# Patient Record
Sex: FEMALE | Race: WHITE | Employment: FULL TIME | URBAN - METROPOLITAN AREA
[De-identification: names, ages, dates, MRNs, and addresses within clinical notes are randomized per-mention and may not be internally consistent; named-entity substitution may affect disease eponyms.]

---

## 2018-03-15 ENCOUNTER — OFFICE VISIT (OUTPATIENT)
Dept: SURGERY | Age: 58
End: 2018-03-15

## 2018-03-15 VITALS
OXYGEN SATURATION: 96 % | SYSTOLIC BLOOD PRESSURE: 126 MMHG | HEART RATE: 84 BPM | HEIGHT: 67 IN | RESPIRATION RATE: 18 BRPM | TEMPERATURE: 98.3 F | WEIGHT: 228 LBS | BODY MASS INDEX: 35.79 KG/M2 | DIASTOLIC BLOOD PRESSURE: 76 MMHG

## 2018-03-15 DIAGNOSIS — K29.60 REFLUX GASTRITIS: ICD-10-CM

## 2018-03-15 DIAGNOSIS — G47.30 SLEEP APNEA, UNSPECIFIED TYPE: ICD-10-CM

## 2018-03-15 DIAGNOSIS — E66.01 MORBID OBESITY (HCC): Primary | ICD-10-CM

## 2018-03-15 RX ORDER — CITALOPRAM 40 MG/1
TABLET, FILM COATED ORAL
Refills: 8 | COMMUNITY
Start: 2018-01-27 | End: 2021-05-10

## 2018-03-15 RX ORDER — NITROFURANTOIN 25; 75 MG/1; MG/1
CAPSULE ORAL
Refills: 0 | COMMUNITY
Start: 2018-03-10 | End: 2021-05-10

## 2018-03-15 RX ORDER — ALPRAZOLAM 0.5 MG/1
TABLET ORAL
Refills: 5 | COMMUNITY
Start: 2018-01-27 | End: 2021-05-10

## 2018-03-15 NOTE — PROGRESS NOTES
New Bariatric Patient. 1. Have you been to the ER, urgent care clinic since your last visit? Hospitalized since your last visit? No    2. Have you seen or consulted any other health care providers outside of the [address] [place] [address] since your last visit? Include any pap smears or colon screening. No          [facility],[address]  Body composition    female  65 y.o. Vitals:    03/15/18 1345   BP: 126/76   Pulse: 84   Resp: 18   Temp: 98.3 °F (36.8 °C)   TempSrc: Oral   SpO2: 96%   Weight: 227 lb 12.8 oz (103.3 kg)   Height: 5' 6.5\" (1.689 m)     Body mass index is 36.22 kg/(m^2). Levorn Infante Neck- 13.5  Waist-47  Hips-51.5  Frame size-medium

## 2018-03-15 NOTE — PATIENT INSTRUCTIONS
Sleeve Gastrectomy: Before Your Surgery  What is a sleeve gastrectomy? Sleeve gastrectomy is surgery to remove part of the stomach. It helps with weight loss. The surgery limits the amount of food your stomach can hold. This can help you eat less and feel full sooner. The surgery is usually done through several small cuts in the belly. These cuts are called incisions. The doctor will place small surgical tools and a camera, called a laparoscope, through the incisions. The doctor will separate the upper part of your stomach from the rest of your stomach. This forms a small pouch. The pouch will hold the food you eat. The doctor will close the cuts in your belly with stitches or surgical staples. These will be removed 7 to 10 days after surgery, unless your doctor uses stitches that dissolve. The incisions will leave scars that fade with time. Most people go home about 2 days after surgery. You will probably need to take 2 to 4 weeks off from work. It depends on the type of work you do and how you feel. Follow-up care is a key part of your treatment and safety. Be sure to make and go to all appointments, and call your doctor if you are having problems. It's also a good idea to know your test results and keep a list of the medicines you take. What happens before surgery? ?Surgery can be stressful. This information will help you understand what you can expect. And it will help you safely prepare for surgery. ? Preparing for surgery  ? · Understand exactly what surgery is planned, along with the risks, benefits, and other options. · Tell your doctors ALL the medicines, vitamins, supplements, and herbal remedies you take. Some of these can increase the risk of bleeding or interact with anesthesia. ? · If you take blood thinners, such as warfarin (Coumadin), clopidogrel (Plavix), or aspirin, be sure to talk to your doctor.  He or she will tell you if you should stop taking these medicines before your surgery. Make sure that you understand exactly what your doctor wants you to do.   ? · Your doctor will tell you which medicines to take or stop before your surgery. You may need to stop taking certain medicines a week or more before surgery. So talk to your doctor as soon as you can.   ? · If you have an advance directive, let your doctor know. It may include a living will and a durable power of  for health care. Bring a copy to the hospital. If you don't have one, you may want to prepare one. It lets your doctor and loved ones know your health care wishes. Doctors advise that everyone prepare these papers before any type of surgery or procedure. What happens on the day of surgery? · Follow the instructions exactly about when to stop eating and drinking. If you don't, your surgery may be canceled. If your doctor told you to take your medicines on the day of surgery, take them with only a sip of water. ? · Take a bath or shower before you come in for your surgery. Do not apply lotions, perfumes, deodorants, or nail polish. ? · Do not shave the surgical site yourself. ? · Take off all jewelry and piercings. And take out contact lenses, if you wear them. ? At the hospital or surgery center   · Bring a picture ID. ? · The area for surgery is often marked to make sure there are no errors. ? · You will be kept comfortable and safe by your anesthesia provider. You will be asleep during the surgery. ? · The surgery will take about 1 to 3 hours. ? · After surgery, the bowel usually \"rests\" for a few days before it starts working again. You may have a thin plastic tube in your nose that goes into your stomach. This tube drains stomach juices and prevents nausea. The drainage usually looks green, brown, or even black with flecks of blood. The tube will be removed in a few days, after your bowels start working again. After the tube is removed, you can start drinking and eating again.    Going home · Be sure you have someone to drive you home. Anesthesia and pain medicine make it unsafe for you to drive. ? · You will be given more specific instructions about recovering from your surgery. They will cover things like diet, wound care, follow-up care, driving, and getting back to your normal routine. When should you call your doctor? · You have questions or concerns. ? · You don't understand how to prepare for your surgery. ? · You become ill before the surgery (such as fever, flu, or a cold). ? · You need to reschedule or have changed your mind about having the surgery. Where can you learn more? Go to http://mayra-josse.info/. Enter Q121 in the search box to learn more about \"Sleeve Gastrectomy: Before Your Surgery. \"  Current as of: October 13, 2016  Content Version: 11.4  © 4542-8272 Healthwise, Incorporated. Care instructions adapted under license by [employer] (which disclaims liability or warranty for this information). If you have questions about a medical condition or this instruction, always ask your healthcare professional. [address] any warranty or liability for your use of this information.

## 2018-03-15 NOTE — PROGRESS NOTES
Lukasz Gonzalez is a 65year old is that presents today for evaluation for weight loss surgery. Patient has been overweight for the past 15 years. Patient height is 5'6.5'', weight is 228 pounds, BMI is 36.25, and frame is medium. Neck circumference is 13 .5 inches, waist is 47 inches, and hip circumference is 51.5 inches    Dietary Habits: Eating 3 meals daily. Cooks at home. Minimal fried foods. Eating larger portions and a lot of starchy foods. Snacking on sweets and chips in the evening. Beveraage of choice is diet soda, drinking one daily. Also drinking coffee and water. Exercise with going to the gym. Prior weight loss attempts: Patient has tried Weight Watcher's, Atkins, and numerous unsupervised diets; all with minimal weight loss     Co-Morbid Conditions/ Past Medical History/ Medications:  Problem List  Date Reviewed: 3/15/2018          Codes Class Noted    Morbid obesity ([facility] [address] ICD-10-CM: E66.01  ICD-9-CM: 278.01  Unknown        Sleep apnea ICD-10-CM: G47.30  ICD-9-CM: 780.57  Unknown        Reflux gastritis ICD-10-CM: K29.60  ICD-9-CM: 535.40  3/15/2018    Overview Signed 3/15/2018  3:09 PM by Vernell Kyle NP     Occasional                  Current Outpatient Prescriptions   Medication Sig    ALPRAZolam (XANAX) 0.5 mg tablet TK 1 T PO TID PRN    citalopram (CELEXA) 40 mg tablet TK 1 T PO QD    nitrofurantoin, macrocrystal-monohydrate, (MACROBID) 100 mg capsule TK 1 C PO Q 12 H     No current facility-administered medications for this visit. Surgical History/ Prior Hospitalizations/ Psychiatric Admissions:  Past Surgical History:   Procedure Laterality Date    HX HERNIA REPAIR      HX OTHER SURGICAL      Dental Implant    HX TONSILLECTOMY      childhood     Pregnancy History and Complications:  3 Para 3     Primary Care and Other Pertinent Providers: Salina Guzman MD is primary care provider.       Allergies: No Known Allergies      Family History:   Family History   Problem Relation Age of Onset    Cancer Mother     Heart Disease Mother     Hypertension Mother     Sleep Apnea Mother     Heart Disease Father     Hypertension Father     Hypertension Sister     Obesity Sister     Diabetes Maternal Grandmother     Hypertension Maternal Grandfather        Social History:   Social History     Social History    Marital status:      Spouse name: N/A    Number of children: 3    Years of education: N/A     Occupational History     Other     [employer]     Social History Main Topics    Smoking status: Former Smoker     Types: Cigarettes     Quit date: 1/1/1990    Smokeless tobacco: Never Used    Alcohol use Yes      Comment: less than once a week    Drug use: No    Sexual activity: Not on file     Other Topics Concern    Not on file     Social History Narrative    No narrative on file           Physical Examination: Blood Pressure today is 126/76. Patient is a pleasant white female in no acute distress. Patient had adequate dentition. Skin is warm and dry. Chest is clear. Heart with regular rate and rhythmn. Abdomen is obese, soft, non-tender, non-distended, bowels sound present in all four quadrants, no masses palpated. Extremities warm to the touch without edema. Sleeve gastrectomy or vertical gastric resection involves a resection of the vast majority of the stomach usually done with a dilator pressed against the right half of the stomach of the lesser curvature. One preserves the pyloric sphincter at the lower end of the stomach and then sequentially staples and divides all the way up to the gastroesophageal junction leaving a small rim of the stomach to the left better known as the angle of His. This procedure significantly reduces the amount that the stomach can hold while removing the part of the stomach that secretes the hormone grehlin and alters the speed of food emptying from the stomach.  Once food leaves the stomach, the remainder of the intestinal tract is completely intact and there is no effect on the digestion or absorption of food nutrients and calories. The procedure is intermediate between the adjustable gastric band and the gastric bypass as far as weight loss, potential complications and resolution of comorbid diseases such as Type 2 diabetes, high blood pressure, sleep apnea, arthritic pain, cholesterol disorders to mention a few. The usual complications are that of any procedure which affects the ability of the stomach to accept food at any given time. Those are usually nausea and vomiting which either spontaneously resolve or require endoscopic dilatation. The most serious complication from this surgery is a leak from the staple line usually near the  gastroesophageal junction. The frequency of this serious complication is low and usually heals spontaneously although reoperation may be necessary. The other serious complications are those which can occur with any major surgery and include  Infection, bleeding, blood clots and/or cardiopulmonary problems. Patient meets criteria established by the NIH. Without weight reduction, co-morbidities will escalate as well as risk of early mortality. Recommendation is patient could be served with surgical weight reduction, the procedure of laparoscopic sleeve gastrectomy was discussed. I explained to the patient differences between laparoscopic and open gastric bypass, laparoscopic adjustable gastric banding, and sleeve gastrectomy procedures. Patient has attended one our informational meeting and has seen our educational materials. Patient desires to have surgery with Dr. Douglas Miles     I have reinforced without lifestyle change and behavior modification, they would not achieve his weight loss goals. I reviewed risks and complications associated with each procedure. Other recommendations include psychological evaluation, nutritional consultation.  Patient to complete 3 consecutive month physician supervised diet. I discussed with patient a diet high in protein, low-fat, low- sugar, limited carbohydrates, and discontinuing use of carbonated beverages. Also discussed physical activity and exercises. I have answered all questions, they wish to proceed.     CC: [facility] MD Aster    55 minutes was spent with patient

## 2018-03-15 NOTE — MR AVS SNAPSHOT
[address] [place] [address] 
[phone] Patient: Chago Cali MRN: [id number] :1960 Visit Information Date & Time Provider Department Dept. Phone Encounter #  
 3/15/2018  2:00 PM Susmha Faye NP [place] [address] [phone] [id number] Upcoming Health Maintenance Date Due Hepatitis C Screening 1960 DTaP/Tdap/Td series (1 - Tdap) 1982 PAP AKA CERVICAL CYTOLOGY 1982 BREAST CANCER SCRN MAMMOGRAM 2011 FOBT Q 1 YEAR AGE 50-75 2011 Influenza Age 5 to Adult 2017 Allergies as of 3/15/2018  Review Complete On: 3/15/2018 By: Sushma Faye NP No Known Allergies Current Immunizations  Never Reviewed No immunizations on file. Not reviewed this visit Vitals BP Pulse Temp Resp Height(growth percentile) Weight(growth percentile) 126/76 (BP 1 Location: Right arm, BP Patient Position: Sitting) 84 98.3 °F (36.8 °C) (Oral) 18 5' 6.5\" (1.689 m) 227 lb 12.8 oz (103.3 kg) LMP SpO2 BMI OB Status Smoking Status 2015 (Approximate) 96% 36.22 kg/m2 Postmenopausal Former Smoker BMI and BSA Data Body Mass Index Body Surface Area  
 36.22 kg/m 2 2.2 m 2 Your Updated Medication List  
  
   
This list is accurate as of 3/15/18  2:53 PM.  Always use your most recent med list.  
  
  
  
  
 ALPRAZolam 0.5 mg tablet Commonly known as:  Dayla Bake TK 1 T PO TID PRN  
  
 citalopram 40 mg tablet Commonly known as:  Emmit Hertz TK 1 T PO QD  
  
 nitrofurantoin (macrocrystal-monohydrate) 100 mg capsule Commonly known as:  MACROBID TK 1 C PO Q 12 H Patient Instructions Sleeve Gastrectomy: Before Your Surgery What is a sleeve gastrectomy? Sleeve gastrectomy is surgery to remove part of the stomach.  It helps with weight loss. The surgery limits the amount of food your stomach can hold. This can help you eat less and feel full sooner. The surgery is usually done through several small cuts in the belly. These cuts are called incisions. The doctor will place small surgical tools and a camera, called a laparoscope, through the incisions. The doctor will separate the upper part of your stomach from the rest of your stomach. This forms a small pouch. The pouch will hold the food you eat. The doctor will close the cuts in your belly with stitches or surgical staples. These will be removed 7 to 10 days after surgery, unless your doctor uses stitches that dissolve. The incisions will leave scars that fade with time. Most people go home about 2 days after surgery. You will probably need to take 2 to 4 weeks off from work. It depends on the type of work you do and how you feel. Follow-up care is a key part of your treatment and safety. Be sure to make and go to all appointments, and call your doctor if you are having problems. It's also a good idea to know your test results and keep a list of the medicines you take. What happens before surgery? ?Surgery can be stressful. This information will help you understand what you can expect. And it will help you safely prepare for surgery. ? Preparing for surgery ? · Understand exactly what surgery is planned, along with the risks, benefits, and other options. · Tell your doctors ALL the medicines, vitamins, supplements, and herbal remedies you take. Some of these can increase the risk of bleeding or interact with anesthesia. ? · If you take blood thinners, such as warfarin (Coumadin), clopidogrel (Plavix), or aspirin, be sure to talk to your doctor. He or she will tell you if you should stop taking these medicines before your surgery.  Make sure that you understand exactly what your doctor wants you to do.  
? · Your doctor will tell you which medicines to take or stop before your surgery. You may need to stop taking certain medicines a week or more before surgery. So talk to your doctor as soon as you can.  
? · If you have an advance directive, let your doctor know. It may include a living will and a durable power of  for health care. Bring a copy to the hospital. If you don't have one, you may want to prepare one. It lets your doctor and loved ones know your health care wishes. Doctors advise that everyone prepare these papers before any type of surgery or procedure. What happens on the day of surgery? · Follow the instructions exactly about when to stop eating and drinking. If you don't, your surgery may be canceled. If your doctor told you to take your medicines on the day of surgery, take them with only a sip of water. ? · Take a bath or shower before you come in for your surgery. Do not apply lotions, perfumes, deodorants, or nail polish. ? · Do not shave the surgical site yourself. ? · Take off all jewelry and piercings. And take out contact lenses, if you wear them. ? At the hospital or surgery center · Bring a picture ID. ? · The area for surgery is often marked to make sure there are no errors. ? · You will be kept comfortable and safe by your anesthesia provider. You will be asleep during the surgery. ? · The surgery will take about 1 to 3 hours. ? · After surgery, the bowel usually \"rests\" for a few days before it starts working again. You may have a thin plastic tube in your nose that goes into your stomach. This tube drains stomach juices and prevents nausea. The drainage usually looks green, brown, or even black with flecks of blood. The tube will be removed in a few days, after your bowels start working again. After the tube is removed, you can start drinking and eating again. Going home · Be sure you have someone to drive you home. Anesthesia and pain medicine make it unsafe for you to drive. ? · You will be given more specific instructions about recovering from your surgery. They will cover things like diet, wound care, follow-up care, driving, and getting back to your normal routine. When should you call your doctor? · You have questions or concerns. ? · You don't understand how to prepare for your surgery. ? · You become ill before the surgery (such as fever, flu, or a cold). ? · You need to reschedule or have changed your mind about having the surgery. Where can you learn more? Go to http://mayra-josse.info/. Enter S930 in the search box to learn more about \"Sleeve Gastrectomy: Before Your Surgery. \" Current as of: October 13, 2016 Content Version: 11.4 © 3462-1659 Healthwise, Incorporated. Care instructions adapted under license by [employer] (which disclaims liability or warranty for this information). If you have questions about a medical condition or this instruction, always ask your healthcare professional. [place] [address] any warranty or liability for your use of this information. Introducing [facility]! Ace Gallegos introduces [employer] patient portal. Now you can access parts of your medical record, email your doctor's office, and request medication refills online. 1. In your internet browser, go to [email or web address] 2. Click on the First Time User? Click Here link in the Sign In box. You will see the New Member Sign Up page. 3. Enter your [employer] Access Code exactly as it appears below. You will not need to use this code after youve completed the sign-up process. If you do not sign up before the expiration date, you must request a new code. · [employer] Access Code: [email or web address] Expires: 6/13/2018  1:23 PM 
 
4. Enter the last four digits of your Social Security Number (xxxx) and Date of Birth (mm/dd/yyyy) as indicated and click Submit. You will be taken to the next sign-up page. 5. Create a [employer] ID. This will be your [employer] login ID and cannot be changed, so think of one that is secure and easy to remember. 6. Create a [employer] password. You can change your password at any time. 7. Enter your Password Reset Question and Answer. This can be used at a later time if you forget your password. 8. Enter your e-mail address. You will receive e-mail notification when new information is available in [address] 9. Click Sign Up. You can now view and download portions of your medical record. 10. Click the Download Summary menu link to download a portable copy of your medical information. If you have questions, please visit the Frequently Asked Questions section of the [employer] website. Remember, [employer] is NOT to be used for urgent needs. For medical emergencies, dial 911. Now available from your iPhone and Android! Please provide this summary of care documentation to your next provider. Your primary care clinician is listed as Romy Lacey III. If you have any questions after today's visit, please call [phone].

## 2020-09-09 VITALS
SYSTOLIC BLOOD PRESSURE: 128 MMHG | DIASTOLIC BLOOD PRESSURE: 70 MMHG | HEIGHT: 67 IN | BODY MASS INDEX: 34.69 KG/M2 | WEIGHT: 221 LBS

## 2020-09-09 PROBLEM — H92.09 OTALGIA: Status: ACTIVE | Noted: 2020-09-09

## 2020-09-09 PROBLEM — R09.81 NASAL CONGESTION: Status: ACTIVE | Noted: 2020-09-09

## 2020-09-09 PROBLEM — J34.89 NASAL OBSTRUCTION: Status: ACTIVE | Noted: 2020-09-09

## 2020-09-09 PROBLEM — F41.1 GENERALIZED ANXIETY DISORDER: Status: ACTIVE | Noted: 2020-09-09

## 2021-05-10 ENCOUNTER — OFFICE VISIT (OUTPATIENT)
Dept: FAMILY MEDICINE CLINIC | Age: 61
End: 2021-05-10
Payer: COMMERCIAL

## 2021-05-10 VITALS
WEIGHT: 230 LBS | DIASTOLIC BLOOD PRESSURE: 76 MMHG | OXYGEN SATURATION: 97 % | SYSTOLIC BLOOD PRESSURE: 120 MMHG | BODY MASS INDEX: 36.57 KG/M2 | HEART RATE: 76 BPM | TEMPERATURE: 97.7 F

## 2021-05-10 DIAGNOSIS — R09.81 NASAL CONGESTION: ICD-10-CM

## 2021-05-10 DIAGNOSIS — Z20.822 SUSPECTED COVID-19 VIRUS INFECTION: Primary | ICD-10-CM

## 2021-05-10 DIAGNOSIS — J30.9 ALLERGIC SINUSITIS: ICD-10-CM

## 2021-05-10 DIAGNOSIS — R30.0 DYSURIA: ICD-10-CM

## 2021-05-10 DIAGNOSIS — R35.1 NOCTURIA: ICD-10-CM

## 2021-05-10 DIAGNOSIS — J06.9 VIRAL UPPER RESPIRATORY TRACT INFECTION: ICD-10-CM

## 2021-05-10 DIAGNOSIS — J34.89 SINUS PRESSURE: ICD-10-CM

## 2021-05-10 DIAGNOSIS — R35.0 INCREASED URINARY FREQUENCY: ICD-10-CM

## 2021-05-10 LAB
BILIRUB UR QL STRIP: NEGATIVE
GLUCOSE UR-MCNC: NEGATIVE MG/DL
KETONES P FAST UR STRIP-MCNC: NEGATIVE MG/DL
PH UR STRIP: 5.5 [PH] (ref 4.6–8)
PROT UR QL STRIP: NEGATIVE
SP GR UR STRIP: 1.01 (ref 1–1.03)
UA UROBILINOGEN AMB POC: NORMAL (ref 0.2–1)
URINALYSIS CLARITY POC: CLEAR
URINALYSIS COLOR POC: YELLOW
URINE BLOOD POC: NEGATIVE
URINE LEUKOCYTES POC: NEGATIVE
URINE NITRITES POC: NEGATIVE

## 2021-05-10 PROCEDURE — 99214 OFFICE O/P EST MOD 30 MIN: CPT | Performed by: NURSE PRACTITIONER

## 2021-05-10 PROCEDURE — 81003 URINALYSIS AUTO W/O SCOPE: CPT | Performed by: NURSE PRACTITIONER

## 2021-05-10 RX ORDER — METFORMIN HYDROCHLORIDE 500 MG/1
2 TABLET, EXTENDED RELEASE ORAL
COMMUNITY
Start: 2021-05-05

## 2021-05-10 RX ORDER — ALPRAZOLAM 0.5 MG/1
TABLET, ORALLY DISINTEGRATING ORAL
COMMUNITY
Start: 2021-05-05

## 2021-05-10 RX ORDER — CETIRIZINE HCL 10 MG
10 TABLET ORAL
Qty: 30 TAB | Refills: 0 | Status: SHIPPED | OUTPATIENT
Start: 2021-05-10

## 2021-05-10 RX ORDER — SOD CHLOR,BICARB/SQUEEZ BOTTLE
1 PACKET, WITH RINSE DEVICE NASAL 2 TIMES DAILY
Qty: 1 EACH | Refills: 1 | Status: SHIPPED | OUTPATIENT
Start: 2021-05-10

## 2021-05-10 RX ORDER — CITALOPRAM 10 MG/1
TABLET ORAL
COMMUNITY
Start: 2021-05-05

## 2021-05-10 RX ORDER — FLUTICASONE PROPIONATE 50 MCG
SPRAY, SUSPENSION (ML) NASAL
Qty: 1 BOTTLE | Refills: 1 | Status: SHIPPED | OUTPATIENT
Start: 2021-05-10

## 2021-05-10 RX ORDER — IPRATROPIUM BROMIDE 42 UG/1
2 SPRAY, METERED NASAL
Qty: 15 ML | Refills: 0 | Status: SHIPPED | OUTPATIENT
Start: 2021-05-10 | End: 2021-06-08

## 2021-05-10 NOTE — PROGRESS NOTES
Subjective  Chief Complaint   Patient presents with    Sinus Infection    Urinary Pain     HPI:  Ninfa Luque is a 62 y.o. female. Today she notes bladder pressure and discomfort, not so much pain. This started 2 weeks ago with no known precipitating factor intermittent bladder pressure and more urinary frequency throughout the day but particularly at night. When she urinates she has no hesitancy or dribbling, she has a normal urine stream. She denies foul smell, discharge, or blood in the urine, loss of bladder/bowel. She notes no urinary history. She had a UTI a year or two ago that was difficult to treat and she needed several courses of abx. No hx of kidney or bladder surgery. She took azo which helped with the pressure and discomfort. She notes two cups of coffee in the am, very infrequent soda or alcohol. Secondary complaints of URI:  3 weeks ago first observed cleaning up a garage, she went thru a box of tissues. She notes mostly clear mucus. Few days later note h/a started, pressure above and below her eyes. The symptoms have change somewhat in the h/a, fatigue, and feeling of drainage, also notes itchy eyes, occ scratchy throat, and fullness in both ears. H/a described as pressure in the front of her head, described as moderate pain mostly tolerable. Today she woke up feeling worse, yellow/green/clear mucus. She noted chills last night. She is vaccinated against covid 19 and everyone she is around is vaccinated. She denies fever, no new bodyaches, change smell/taste, sob, wheezing, nvd. Hasn't attempted any treatments. She dislikes taking medicines. Objective  Visit Vitals  /76 (BP 1 Location: Left upper arm, BP Patient Position: Sitting)   Pulse 76   Temp 97.7 °F (36.5 °C) (Temporal)   Wt 230 lb (104.3 kg)   SpO2 97%   BMI 36.57 kg/m²     Physical Exam  Constitutional:       Appearance: She is obese. HENT:      Head: Normocephalic.       Right Ear: Tympanic membrane normal. There is no impacted cerumen. Left Ear: Tympanic membrane normal. There is no impacted cerumen. Nose:      Right Sinus: Maxillary sinus tenderness present. Left Sinus: Maxillary sinus tenderness present. Mouth/Throat:      Mouth: Mucous membranes are moist.      Pharynx: No oropharyngeal exudate or posterior oropharyngeal erythema. Eyes:      Pupils: Pupils are equal, round, and reactive to light. Neck:      Musculoskeletal: Normal range of motion. Cardiovascular:      Rate and Rhythm: Normal rate and regular rhythm. Pulmonary:      Effort: Pulmonary effort is normal. No respiratory distress. Breath sounds: Normal breath sounds. No wheezing. Lymphadenopathy:      Cervical: No cervical adenopathy. Neurological:      Mental Status: She is alert. Assessment & Plan  Diagnoses and all orders for this visit:    1. Suspected COVID-19 virus infection  -     NOVEL CORONAVIRUS (COVID-19); Future    2. Viral upper respiratory tract infection  I have a low suspicion for Covid due to the patient being vaccinated, however she does have symptoms. Otherwise viral URI accompanied by or being contributed to allergic reaction due to a heavy pollen this season. 3. Allergic sinusitis  Advised the patient to try the treatments listed below, if no improvement in 1 week to call me at the clinic for a prescription for amoxicillin. 4. Sinus pressure  -     sod chlor-bicarb-squeez bottle (Neilmed Sinus Rinse Complete) pkdv; 1 Kit by sinus irrigation route two (2) times a day. Rinse prior to intranasal steroid use  Indications: stuffy nose  -     fluticasone propionate (FLONASE) 50 mcg/actuation nasal spray; 1-2 sprays each nostril daily; place tip of nozzle in nose, aim towards same side sinus/eye, sniff gently and depress quickly, do not swallow. Indications: inflammation of the nose due to an allergy    5. Nasal congestion  -     cetirizine (ZYRTEC) 10 mg tablet;  Take 1 Tab by mouth nightly. Indications: inflammation of the nose due to an allergy  -     ipratropium (ATROVENT) 42 mcg (0.06 %) nasal spray; 2 Sprays by Both Nostrils route four (4) times daily as needed for Rhinitis. May use up to three weeks consecutively. Indications: runny nose    6. Increased urinary frequency  Normal urine dip, I am suspicious for overactive bladder. Advised lifestyle changes and exercises per instructions. If urine culture is normal and no improvement will trial Ditropan before a referral to uro-GYN is placed. 7. Dysuria  -     AMB POC URINALYSIS DIP STICK AUTO W/O MICRO  -     CULTURE, URINE    8.  Nocturia                Casper Ryan NP

## 2021-05-10 NOTE — PROGRESS NOTES
Chief Complaint   Patient presents with    Sinus Infection    Urinary Pain     1. Have you been to the ER, urgent care clinic since your last visit? Hospitalized since your last visit? No    2. Have you seen or consulted any other health care providers outside of the [address] [place] [address] since your last visit? Include any pap smears or colon screening.  Yes, Dr. Fatemeh Nagel (cardiology) f/u and OB/GYN for weight/dietary help

## 2021-05-10 NOTE — PATIENT INSTRUCTIONS
Bladder Training: Care Instructions Your Care Instructions Bladder training is used to treat urge incontinence and stress incontinence. Urge incontinence means that the need to urinate comes on so fast that you can't get to a toilet in time. Stress incontinence means that you leak urine because of pressure on your bladder. For example, it may happen when you laugh, cough, or lift something heavy. Bladder training can increase how long you can wait before you have to urinate. It can also help your bladder hold more urine. And it can give you better control over the urge to urinate. It is important to remember that bladder training takes a few weeks to a few months to make a difference. You may not see results right away, but don't give up. Follow-up care is a key part of your treatment and safety. Be sure to make and go to all appointments, and call your doctor if you are having problems. It's also a good idea to know your test results and keep a list of the medicines you take. How can you care for yourself at home? Work with your doctor to come up with a bladder training program that is right for you. You may use one or more of the following methods. Delayed urination · In the beginning, try to keep from urinating for 5 minutes after you first feel the need to go. · While you wait, take deep, slow breaths to relax. Kegel exercises can also help you delay the need to go to the bathroom. · After some practice, when you can easily wait 5 minutes to urinate, try to wait 10 minutes before you urinate. · Slowly increase the waiting period until you are able to control when you have to urinate. Scheduled urination · Empty your bladder when you first wake up in the morning. · Schedule times throughout the day when you will urinate. · Start by going to the bathroom every hour, even if you don't need to go. · Slowly increase the time between trips to the bathroom.  
· When you have found a schedule that works well for you, keep doing it. · If you wake up during the night and have to urinate, do it. Apply your schedule to waking hours only. Kegel exercises These tighten and strengthen pelvic muscles, which can help you control the flow of urine. To do Kegel exercises: 
· Squeeze the same muscles you would use to stop your urine. Your belly and thighs should not move. · Hold the squeeze for 3 seconds, and then relax for 3 seconds. · Start with 3 seconds. Then add 1 second each week until you are able to squeeze for 10 seconds. · Repeat the exercise 10 to 15 times a session. Do three or more sessions a day. When should you call for help? Watch closely for changes in your health, and be sure to contact your doctor if: 
  · Your incontinence is getting worse.  
  · You do not get better as expected. Where can you learn more? Go to http://www.gray.com/ Enter T412 in the search box to learn more about \"Bladder Training: Care Instructions. \" Current as of: June 29, 2020               Content Version: 12.8 © 5842-0723 [employer]. Care instructions adapted under license by [employer] (which disclaims liability or warranty for this information). If you have questions about a medical condition or this instruction, always ask your healthcare professional. [address] any warranty or liability for your use of this information. Sinusitis: Care Instructions Your Care Instructions Sinusitis is an infection of the lining of the sinus cavities in your head. Sinusitis often follows a cold. It causes pain and pressure in your head and face. In most cases, sinusitis gets better on its own in 1 to 2 weeks. But some mild symptoms may last for several weeks. Sometimes antibiotics are needed. Follow-up care is a key part of your treatment and safety.  Be sure to make and go to all appointments, and call your doctor if you are having problems. It's also a good idea to know your test results and keep a list of the medicines you take. How can you care for yourself at home? · Take an over-the-counter pain medicine, such as acetaminophen (Tylenol), ibuprofen (Advil, Motrin), or naproxen (Aleve). Read and follow all instructions on the label. · If the doctor prescribed antibiotics, take them as directed. Do not stop taking them just because you feel better. You need to take the full course of antibiotics. · Be careful when taking over-the-counter cold or flu medicines and Tylenol at the same time. Many of these medicines have acetaminophen, which is Tylenol. Read the labels to make sure that you are not taking more than the recommended dose. Too much acetaminophen (Tylenol) can be harmful. · Breathe warm, moist air from a steamy shower, a hot bath, or a sink filled with hot water. Avoid cold, dry air. Using a humidifier in your home may help. Follow the directions for cleaning the machine. · Use saline (saltwater) nasal washes. This can help keep your nasal passages open and wash out mucus and bacteria. You can buy saline nose drops at a grocery store or drugstore. Or you can make your own at home by adding 1 teaspoon of salt and 1 teaspoon of baking soda to 2 cups of distilled water. If you make your own, fill a bulb syringe with the solution, insert the tip into your nostril, and squeeze gently. Gilles Crosser your nose. · Put a hot, wet towel or a warm gel pack on your face 3 or 4 times a day for 5 to 10 minutes each time. · Try a decongestant nasal spray like oxymetazoline (Afrin). Do not use it for more than 3 days in a row. Using it for more than 3 days can make your congestion worse. When should you call for help? Call your doctor now or seek immediate medical care if: 
  · You have new or worse swelling or redness in your face or around your eyes.  
  · You have a new or higher fever.   
Watch closely for changes in your health, and be sure to contact your doctor if: 
  · You have new or worse facial pain.  
  · The mucus from your nose becomes thicker (like pus) or has new blood in it.  
  · You are not getting better as expected. Where can you learn more? Go to http://www.gray.com/ Enter X930 in the search box to learn more about \"Sinusitis: Care Instructions. \" Current as of: December 2, 2020               Content Version: 12.8 © 2006-2021 [employer]. Care instructions adapted under license by [employer] (which disclaims liability or warranty for this information). If you have questions about a medical condition or this instruction, always ask your healthcare professional. [address] any warranty or liability for your use of this information.

## 2021-05-11 ENCOUNTER — TELEPHONE (OUTPATIENT)
Dept: FAMILY MEDICINE CLINIC | Age: 61
End: 2021-05-11

## 2021-05-11 NOTE — TELEPHONE ENCOUNTER
Per pharmacist they are going to get in contact with the insurance company and see if they can't fix the issue.

## 2021-05-11 NOTE — TELEPHONE ENCOUNTER
Patient was given 4 prescriptions at her visit yesterday and her insurance does not cover any of them. Can you please check for other options. Zyrtec, Flonase, Atrovent, Neilmed Sinus Rinse.   Please call pateint

## 2021-05-11 NOTE — TELEPHONE ENCOUNTER
Spoke to pharmacy and they stated that it is something to do with the pt's insurance plan that it can not find the provider by his NPI.

## 2021-05-11 NOTE — TELEPHONE ENCOUNTER
PT called back and pharmacy stated that Raul[employer] Chinch[employer] Sub[employer] ID is not valid on her RX.  Please contact pharmacy regarding matter

## 2021-05-12 LAB
SARS-COV-2, NAA 2 DAY TAT: NORMAL
SARS-COV-2, NAA: NOT DETECTED

## 2021-05-13 LAB — BACTERIA UR CULT: ABNORMAL

## 2021-05-13 NOTE — PROGRESS NOTES
If she is still having issues we can treat the UTI with either augmentin or bactrim. I see no allergies in her chart, I generally lean towards bactrim in treating UTIs.   Let me know thanks

## 2021-05-14 DIAGNOSIS — N30.90 CYSTITIS: Primary | ICD-10-CM

## 2021-05-14 RX ORDER — SULFAMETHOXAZOLE AND TRIMETHOPRIM 800; 160 MG/1; MG/1
1 TABLET ORAL 2 TIMES DAILY
Qty: 6 TAB | Refills: 0 | Status: SHIPPED | OUTPATIENT
Start: 2021-05-14 | End: 2021-05-17

## 2021-05-14 NOTE — PROGRESS NOTES
Results reviewed with pt. Pt still having pressure when urinating. Requesting Bactrim to be sent to the pharmacy.

## 2021-06-08 ENCOUNTER — OFFICE VISIT (OUTPATIENT)
Dept: FAMILY MEDICINE CLINIC | Age: 61
End: 2021-06-08
Payer: COMMERCIAL

## 2021-06-08 VITALS
DIASTOLIC BLOOD PRESSURE: 80 MMHG | RESPIRATION RATE: 12 BRPM | BODY MASS INDEX: 36.44 KG/M2 | OXYGEN SATURATION: 95 % | TEMPERATURE: 97.3 F | WEIGHT: 232.2 LBS | HEIGHT: 67 IN | SYSTOLIC BLOOD PRESSURE: 124 MMHG | HEART RATE: 62 BPM

## 2021-06-08 DIAGNOSIS — N30.90 CYSTITIS: Primary | ICD-10-CM

## 2021-06-08 DIAGNOSIS — R10.32 BILATERAL LOWER ABDOMINAL DISCOMFORT: ICD-10-CM

## 2021-06-08 DIAGNOSIS — R31.29 MICROSCOPIC HEMATURIA: ICD-10-CM

## 2021-06-08 DIAGNOSIS — Z91.89 AT RISK FOR DEHYDRATION: ICD-10-CM

## 2021-06-08 DIAGNOSIS — R10.31 BILATERAL LOWER ABDOMINAL DISCOMFORT: ICD-10-CM

## 2021-06-08 LAB
BILIRUB UR QL STRIP: NEGATIVE
GLUCOSE UR-MCNC: NEGATIVE MG/DL
KETONES P FAST UR STRIP-MCNC: NEGATIVE MG/DL
PH UR STRIP: 5.5 [PH] (ref 4.6–8)
PROT UR QL STRIP: NEGATIVE
SP GR UR STRIP: 1.03 (ref 1–1.03)
UA UROBILINOGEN AMB POC: NORMAL (ref 0.2–1)
URINALYSIS CLARITY POC: NORMAL
URINALYSIS COLOR POC: YELLOW
URINE BLOOD POC: NORMAL
URINE LEUKOCYTES POC: NEGATIVE
URINE NITRITES POC: NEGATIVE

## 2021-06-08 PROCEDURE — 81003 URINALYSIS AUTO W/O SCOPE: CPT | Performed by: NURSE PRACTITIONER

## 2021-06-08 PROCEDURE — 99213 OFFICE O/P EST LOW 20 MIN: CPT | Performed by: NURSE PRACTITIONER

## 2021-06-08 RX ORDER — TAMSULOSIN HYDROCHLORIDE 0.4 MG/1
CAPSULE ORAL
Qty: 90 CAPSULE | Refills: 0 | Status: SHIPPED | OUTPATIENT
Start: 2021-06-08

## 2021-06-08 RX ORDER — AMOXICILLIN AND CLAVULANATE POTASSIUM 500; 125 MG/1; MG/1
1 TABLET, FILM COATED ORAL 2 TIMES DAILY
Qty: 14 TABLET | Refills: 0 | Status: SHIPPED | OUTPATIENT
Start: 2021-06-08 | End: 2021-06-15

## 2021-06-08 RX ORDER — TAMSULOSIN HYDROCHLORIDE 0.4 MG/1
0.4 CAPSULE ORAL DAILY
Qty: 30 CAPSULE | Refills: 0 | Status: SHIPPED | OUTPATIENT
Start: 2021-06-08 | End: 2021-06-08

## 2021-06-08 NOTE — PATIENT INSTRUCTIONS
Urinary Tract Infection (UTI) in Women: Care Instructions Overview A urinary tract infection, or UTI, is a general term for an infection anywhere between the kidneys and the urethra (where urine comes out). Most UTIs are bladder infections. They often cause pain or burning when you urinate. UTIs are caused by bacteria and can be cured with antibiotics. Be sure to complete your treatment so that the infection does not get worse. Follow-up care is a key part of your treatment and safety. Be sure to make and go to all appointments, and call your doctor if you are having problems. It's also a good idea to know your test results and keep a list of the medicines you take. How can you care for yourself at home? · Take your antibiotics as directed. Do not stop taking them just because you feel better. You need to take the full course of antibiotics. · Drink extra water and other fluids for the next day or two. This will help make the urine less concentrated and help wash out the bacteria that are causing the infection. (If you have kidney, heart, or liver disease and have to limit fluids, talk with your doctor before you increase the amount of fluids you drink.) · Avoid drinks that are carbonated or have caffeine. They can irritate the bladder. · Urinate often. Try to empty your bladder each time. · To relieve pain, take a hot bath or lay a heating pad set on low over your lower belly or genital area. Never go to sleep with a heating pad in place. To prevent UTIs · Drink plenty of water each day. This helps you urinate often, which clears bacteria from your system. (If you have kidney, heart, or liver disease and have to limit fluids, talk with your doctor before you increase the amount of fluids you drink.) · Urinate when you need to. · If you are sexually active, urinate right after you have sex. · Change sanitary pads often.  
· Avoid douches, bubble baths, feminine hygiene sprays, and other feminine hygiene products that have deodorants. · After going to the bathroom, wipe from front to back. When should you call for help? Call your doctor now or seek immediate medical care if: 
  · Symptoms such as fever, chills, nausea, or vomiting get worse or appear for the first time.  
  · You have new pain in your back just below your rib cage. This is called flank pain.  
  · There is new blood or pus in your urine.  
  · You have any problems with your antibiotic medicine. Watch closely for changes in your health, and be sure to contact your doctor if: 
  · You are not getting better after taking an antibiotic for 2 days.  
  · Your symptoms go away but then come back. Where can you learn more? Go to http://www.gray.com/ Enter K717 in the search box to learn more about \"Urinary Tract Infection (UTI) in Women: Care Instructions. \" Current as of: June 29, 2020               Content Version: 12.8 © 2006-2021 [employer]. Care instructions adapted under license by [employer] (which disclaims liability or warranty for this information). If you have questions about a medical condition or this instruction, always ask your healthcare professional. [address] any warranty or liability for your use of this information.

## 2021-06-08 NOTE — PROGRESS NOTES
Subjective  Chief Complaint   Patient presents with    Sinus Infection    Urinary Pain     HPI:  Amber Tello is a 62 y.o. female. Last seen by me on 5/10, was treated with bactrim. She felt it helped initially, but not long after symptoms returned. Today she notes bladder pressure and discomfort, not so much pain. This started 4 weeks ago with no known precipitating; factor intermittent bladder pressure and more urinary frequency throughout the day but particularly at night. She also notes lower abdominal pressure, she denies n/v/d or other change in BM; but sometimes notes urgency to have BM. When she urinates she has no hesitancy or dribbling, she has a normal urine stream. She denies foul smell, discharge, or blood in the urine, no low back painloss of bladder/bowel. She had a UTI a year or two ago that was difficult to treat and she needed several courses of abx. No hx of kidney or bladder surgery. She has been taking azo which helped with the pressure and discomfort. She notes two cups of coffee in the am and has been cutting back at my suggestion that this can irritate the bladder. Objective  Visit Vitals  /76 (BP 1 Location: Left upper arm, BP Patient Position: Sitting)   Pulse 76   Temp 97.7 °F (36.5 °C) (Temporal)   Wt 230 lb (104.3 kg)   SpO2 97%   BMI 36.57 kg/m²     Physical Exam  Constitutional:       Appearance: She is obese. HENT:      Head: Normocephalic. Eyes:      Pupils: Pupils are equal, round, and reactive to light. Neck:      Musculoskeletal: Normal range of motion. Cardiovascular:      Rate and Rhythm: Normal rate and regular rhythm. Pulmonary:      Effort: Pulmonary effort is normal. No respiratory distress. Breath sounds: Normal breath sounds. No wheezing. Gastro:  Normal bowel sounds; TTP, no rebound. Lymphadenopathy:      Cervical: No cervical adenopathy. Neurological:      Mental Status: She is alert.           Assessment & Plan  Diagnoses and all orders for this visit:    1. Cystitis  -     amoxicillin-clavulanate (AUGMENTIN) 500-125 mg per tablet; Take 1 Tablet by mouth two (2) times a day for 7 days. Indications: bacterial urinary tract infection  -     AMB POC URINALYSIS DIP STICK AUTO W/O MICRO  -     CULTURE, URINE  Similar to  previous POC UA, the urinalysis was also negative for leukocytes and nitrate, however the previous culture did show E. coli that was susceptible to both Bactrim and Augmentin. I went with Bactrim as a first-line, however she may have a permanent colonization. My preference is to try beta-lactam before using a flouroquinolone. 2. Microscopic hematuria  -     tamsulosin (Flomax) 0.4 mg capsule; Take 1 Capsule by mouth daily. Indications: stones in the urinary tract  Suggest to be consider urolithiasis as a differential, if no improvement with antibiotic she will take the Flomax for the next 30 days and then refer to urology if symptoms persist.  3. Bilateral lower abdominal discomfort    4. At risk for dehydration  Noted to her specific gravity was a reflection of her hydration status, at 1.030 suggest that she is not drinking enough water. I urged patient to drink more water as this is key to flushing pathogenic bacteria from her bladder.

## 2021-06-11 LAB
BACTERIA UR CULT: ABNORMAL
BACTERIA UR CULT: ABNORMAL

## 2021-06-12 NOTE — PROGRESS NOTES
Culture is still showing E. Coli, which is a common bacteria often found in human GI tract. However, when it gets into the urinary contract it can cause UTIs, the antibiotics I provided last time should be sufficient. However, there are others we can try if it continues to be bothersome. Just call and let us know if she is still having issues.

## 2022-03-18 PROBLEM — H92.09 OTALGIA: Status: ACTIVE | Noted: 2020-09-09

## 2022-03-18 PROBLEM — F41.1 GENERALIZED ANXIETY DISORDER: Status: ACTIVE | Noted: 2020-09-09

## 2022-03-19 PROBLEM — R09.81 NASAL CONGESTION: Status: ACTIVE | Noted: 2020-09-09

## 2022-03-19 PROBLEM — J34.89 NASAL OBSTRUCTION: Status: ACTIVE | Noted: 2020-09-09

## 2022-03-19 PROBLEM — K29.60 REFLUX GASTRITIS: Status: ACTIVE | Noted: 2018-03-15

## 2022-05-25 ENCOUNTER — TRANSCRIBE ORDER (OUTPATIENT)
Dept: SCHEDULING | Age: 62
End: 2022-05-25

## 2022-05-25 DIAGNOSIS — Z13.820 SPECIAL SCREENING FOR OSTEOPOROSIS: ICD-10-CM

## 2022-05-25 DIAGNOSIS — R94.5 ABNORMAL RESULTS OF LIVER FUNCTION STUDIES: Primary | ICD-10-CM

## 2023-04-23 DIAGNOSIS — R94.5 ABNORMAL RESULTS OF LIVER FUNCTION STUDIES: Primary | ICD-10-CM

## 2023-04-23 DIAGNOSIS — Z13.820 SPECIAL SCREENING FOR OSTEOPOROSIS: Primary | ICD-10-CM

## 2023-05-21 RX ORDER — METFORMIN HYDROCHLORIDE 500 MG/1
2 TABLET, EXTENDED RELEASE ORAL
COMMUNITY
Start: 2021-05-05

## 2023-05-21 RX ORDER — CITALOPRAM 10 MG/1
1 TABLET ORAL DAILY
COMMUNITY
Start: 2021-05-05

## 2023-05-21 RX ORDER — ALPRAZOLAM 0.5 MG/1
TABLET, ORALLY DISINTEGRATING ORAL
COMMUNITY
Start: 2021-05-05

## 2023-05-21 RX ORDER — TAMSULOSIN HYDROCHLORIDE 0.4 MG/1
1 CAPSULE ORAL DAILY
COMMUNITY
Start: 2021-06-08

## 2023-05-21 RX ORDER — CETIRIZINE HYDROCHLORIDE 10 MG/1
10 TABLET ORAL
COMMUNITY
Start: 2021-05-10

## 2023-05-21 RX ORDER — FLUTICASONE PROPIONATE 50 MCG
SPRAY, SUSPENSION (ML) NASAL
COMMUNITY
Start: 2021-05-10